# Patient Record
Sex: MALE | Race: BLACK OR AFRICAN AMERICAN | Employment: OTHER | URBAN - METROPOLITAN AREA
[De-identification: names, ages, dates, MRNs, and addresses within clinical notes are randomized per-mention and may not be internally consistent; named-entity substitution may affect disease eponyms.]

---

## 2017-01-31 ENCOUNTER — TELEPHONE (OUTPATIENT)
Dept: INTERNAL MEDICINE CLINIC | Age: 37
End: 2017-01-31

## 2017-03-31 ENCOUNTER — OFFICE VISIT (OUTPATIENT)
Dept: INTERNAL MEDICINE CLINIC | Age: 37
End: 2017-03-31

## 2017-03-31 VITALS
RESPIRATION RATE: 18 BRPM | WEIGHT: 234 LBS | HEART RATE: 52 BPM | DIASTOLIC BLOOD PRESSURE: 70 MMHG | HEIGHT: 72 IN | SYSTOLIC BLOOD PRESSURE: 100 MMHG | OXYGEN SATURATION: 98 % | BODY MASS INDEX: 31.69 KG/M2

## 2017-03-31 DIAGNOSIS — S33.5XXA LUMBAR SPRAIN, INITIAL ENCOUNTER: Primary | ICD-10-CM

## 2017-03-31 PROCEDURE — 99202 OFFICE O/P NEW SF 15 MIN: CPT | Performed by: FAMILY MEDICINE

## 2017-03-31 RX ORDER — CYCLOBENZAPRINE HCL 10 MG
10 TABLET ORAL 3 TIMES DAILY PRN
COMMUNITY
End: 2019-06-14

## 2017-03-31 RX ORDER — PREDNISONE 20 MG/1
20 TABLET ORAL DAILY
Qty: 10 TABLET | Refills: 0 | Status: SHIPPED | OUTPATIENT
Start: 2017-03-31 | End: 2017-04-10

## 2017-04-01 ENCOUNTER — HOSPITAL ENCOUNTER (OUTPATIENT)
Dept: OTHER | Age: 37
Discharge: HOME | End: 2017-04-01
Attending: FAMILY MEDICINE | Admitting: FAMILY MEDICINE

## 2017-04-04 ENCOUNTER — TELEPHONE (OUTPATIENT)
Dept: INTERNAL MEDICINE CLINIC | Age: 37
End: 2017-04-04

## 2017-04-04 DIAGNOSIS — S33.5XXA LUMBAR SPRAIN, INITIAL ENCOUNTER: Primary | ICD-10-CM

## 2017-04-10 ASSESSMENT — ENCOUNTER SYMPTOMS
CONSTIPATION: 0
DIARRHEA: 0
SHORTNESS OF BREATH: 0
ABDOMINAL PAIN: 0
TROUBLE SWALLOWING: 0
VOICE CHANGE: 0
BLOOD IN STOOL: 0

## 2019-06-14 ENCOUNTER — HOSPITAL ENCOUNTER (EMERGENCY)
Age: 39
Discharge: HOME | End: 2019-06-14
Attending: FAMILY MEDICINE
Payer: MEDICAID

## 2019-06-14 VITALS
HEART RATE: 71 BPM | TEMPERATURE: 98.2 F | OXYGEN SATURATION: 99 % | SYSTOLIC BLOOD PRESSURE: 127 MMHG | DIASTOLIC BLOOD PRESSURE: 78 MMHG | BODY MASS INDEX: 33.73 KG/M2 | RESPIRATION RATE: 15 BRPM | HEIGHT: 71 IN | WEIGHT: 240.96 LBS

## 2019-06-14 DIAGNOSIS — B34.9 VIRAL ILLNESS: Primary | ICD-10-CM

## 2019-06-14 DIAGNOSIS — M79.10 MYALGIA: ICD-10-CM

## 2019-06-14 LAB
RAPID INFLUENZA  B AGN: NEGATIVE
RAPID INFLUENZA A AGN: NEGATIVE

## 2019-06-14 PROCEDURE — 87804 INFLUENZA ASSAY W/OPTIC: CPT

## 2019-06-14 PROCEDURE — 99283 EMERGENCY DEPT VISIT LOW MDM: CPT

## 2019-06-14 PROCEDURE — 6370000000 HC RX 637 (ALT 250 FOR IP): Performed by: FAMILY MEDICINE

## 2019-06-14 RX ORDER — NAPROXEN 500 MG/1
500 TABLET ORAL 2 TIMES DAILY
Qty: 20 TABLET | Refills: 0 | Status: SHIPPED | OUTPATIENT
Start: 2019-06-14 | End: 2019-06-24

## 2019-06-14 RX ORDER — PROMETHAZINE HYDROCHLORIDE 25 MG/1
25 TABLET ORAL EVERY 6 HOURS PRN
Qty: 10 TABLET | Refills: 0 | Status: SHIPPED | OUTPATIENT
Start: 2019-06-14 | End: 2019-06-21

## 2019-06-14 RX ORDER — PROMETHAZINE HYDROCHLORIDE 25 MG/1
25 TABLET ORAL ONCE
Status: COMPLETED | OUTPATIENT
Start: 2019-06-14 | End: 2019-06-14

## 2019-06-14 RX ORDER — ACETAMINOPHEN 500 MG
1000 TABLET ORAL ONCE
Status: COMPLETED | OUTPATIENT
Start: 2019-06-14 | End: 2019-06-14

## 2019-06-14 RX ADMIN — ACETAMINOPHEN 1000 MG: 500 TABLET ORAL at 04:15

## 2019-06-14 RX ADMIN — PROMETHAZINE HYDROCHLORIDE 25 MG: 25 TABLET ORAL at 04:15

## 2019-06-14 ASSESSMENT — PAIN SCALES - GENERAL
PAINLEVEL_OUTOF10: 0
PAINLEVEL_OUTOF10: 9
PAINLEVEL_OUTOF10: 8

## 2019-06-14 ASSESSMENT — PAIN DESCRIPTION - DESCRIPTORS: DESCRIPTORS: ACHING

## 2019-06-14 ASSESSMENT — PAIN DESCRIPTION - FREQUENCY: FREQUENCY: CONTINUOUS

## 2019-06-14 ASSESSMENT — PAIN - FUNCTIONAL ASSESSMENT: PAIN_FUNCTIONAL_ASSESSMENT: PREVENTS OR INTERFERES SOME ACTIVE ACTIVITIES AND ADLS

## 2019-06-14 ASSESSMENT — PAIN DESCRIPTION - LOCATION: LOCATION: GENERALIZED

## 2019-06-14 ASSESSMENT — PAIN DESCRIPTION - ONSET: ONSET: ON-GOING

## 2019-06-14 ASSESSMENT — PAIN DESCRIPTION - PROGRESSION: CLINICAL_PROGRESSION: GRADUALLY WORSENING

## 2019-06-14 ASSESSMENT — PAIN DESCRIPTION - PAIN TYPE: TYPE: ACUTE PAIN

## 2019-06-20 NOTE — ED PROVIDER NOTES
Triage Chief Complaint:   Fever and Generalized Body Aches    EMEKA House  is a 41 y.o. male that presents with flulike illness symptoms to include muscle aches, subjective fever, runny nose. No cough or shortness of breath. No treatment tried at home. Patient is brought in by his wife who is pregnant requesting evaluation for flu to make sure that she does not need Tamiflu. Patient is tolerating p.o. No diarrhea constipation. No underlying immunocompromise condition. ROS:  General: Subjective fevers, no chills, no weakness  Eyes:  No recent vison changes, no discharge  ENT:  No sore throat, no nasal congestion, no hearing changes  Cardiovascular:  No chest pain, no palpitations  Respiratory:  No shortness of breath, no cough, no wheezing  Gastrointestinal: Generalized muscle pain, no nausea, no vomiting, no diarrhea  Musculoskeletal:  No muscle pain, no joint pain  Skin:  No rash, no pruritis, no easy bruising  Neurologic:  No speech problems, no headache, no extremity numbness, no extremity tingling, no extremity weakness  Psychiatric:  No anxiety, no hallucinations or delusions, no suicidal or homicidal ideation  Genitourinary:  No dysuria, no hematuria  Endocrine:  No unexpected weight gain, no unexpected weight loss  Extremities:  no edema, no pain    Past Medical History:   Diagnosis Date    Back pain     Bipolar disorder (HCC)     Chronic back pain     Depression     GERD (gastroesophageal reflux disease)      Past Surgical History:   Procedure Laterality Date    HERNIA REPAIR      INGUINAL HERNIA REPAIR      left side     History reviewed. No pertinent family history.   Social History     Socioeconomic History    Marital status: Single     Spouse name: Not on file    Number of children: Not on file    Years of education: Not on file    Highest education level: Not on file   Occupational History    Not on file   Social Needs    Financial resource strain: Not on file   Gareth-Kee insecurity:     Worry: Not on file     Inability: Not on file    Transportation needs:     Medical: Not on file     Non-medical: Not on file   Tobacco Use    Smoking status: Never Smoker    Smokeless tobacco: Never Used   Substance and Sexual Activity    Alcohol use: Yes     Alcohol/week: 2.4 oz     Types: 4 Shots of liquor per week     Comment: casual    Drug use: No    Sexual activity: Yes     Partners: Female   Lifestyle    Physical activity:     Days per week: Not on file     Minutes per session: Not on file    Stress: Not on file   Relationships    Social connections:     Talks on phone: Not on file     Gets together: Not on file     Attends [employer] service: Not on file     Active member of club or organization: Not on file     Attends meetings of clubs or organizations: Not on file     Relationship status: Not on file    Intimate partner violence:     Fear of current or ex partner: Not on file     Emotionally abused: Not on file     Physically abused: Not on file     Forced sexual activity: Not on file   Other Topics Concern    Not on file   Social History Narrative    ** Merged History Encounter **          No current facility-administered medications for this encounter. Current Outpatient Medications   Medication Sig Dispense Refill    naproxen (NAPROSYN) 500 MG tablet Take 1 tablet by mouth 2 times daily for 10 days 20 tablet 0    promethazine (PHENERGAN) 25 MG tablet Take 1 tablet by mouth every 6 hours as needed for Nausea 10 tablet 0     No Known Allergies    Nursing Notes Reviewed    Physical Exam:  ED Triage Vitals [06/14/19 0333]   Enc Vitals Group      /83      Pulse 79      Resp 14      Temp 98.9 °F (37.2 °C)      Temp Source Oral      SpO2 97 %      Weight 240 lb 15.4 oz (109.3 kg)      Height 5' 11\" (1.803 m)      Head Circumference       Peak Flow       Pain Score       Pain Loc       Pain Edu? Excl. in [address]?         My pulse ox interpretation is - normal    General appearance:  No acute distress. Skin:  Warm. Dry. No petechiae or purpura. Eye:  Extraocular movements intact. PERRLA  Ears, nose, mouth and throat:  Oral mucosa moist, no trismus. Tympanic membranes bilaterally normal.  Oropharynx with no exudate or erythema. Neck:  Trachea midline. Supple. No cervical lymphadenopathy  Extremity:  No swelling. Normal ROM. No calf pain or asymmetric swelling. No lower extremity edema  Heart:  Regular rate and rhythm, normal S1 & S2, no extra heart sounds. Perfusion:  Intact, capillary refill less than 2 seconds  Respiratory:  Lungs clear to auscultation bilaterally. Respirations nonlabored. Abdominal:  Normal bowel sounds. Soft. No peritoneal signs. No hepatosplenomegaly. Back:  No CVA tenderness to palpation. No bruising. No CTL tenderness to palpation or step-off  Neurological:  Alert and oriented times 3. No focal neuro deficits. Cranial nerves II through XII are grossly intact. Psychiatric:  Appropriate    I have reviewed and interpreted all of the currently available lab results from this visit (if applicable):  Results for orders placed or performed during the hospital encounter of 06/14/19   Rapid influenza A/B antigens   Result Value Ref Range    Rapid Influenza A Ag Negative Negative    Rapid Influenza B Ag Negative Negative      Radiographs (if obtained):  [] The following radiograph was interpreted by myself in the absence of a radiologist:   [] Radiologist's Report Reviewed:  No orders to display         EKG (if obtained): (All EKG's are interpreted by myself in the absence of a cardiologist)    Chart review shows recent radiographs:  No results found. MDM:  24-year-old nontoxic well-appearing male with normal vital signs. Symptoms treated with 1 g p.o. Tylenol as well as 25 mg p.o. Phenergan and 1 L of p.o. water which she tolerated without difficulty. Influenza a and B are negative.     I estimate there is LOW risk for

## 2021-08-22 PROCEDURE — 99283 EMERGENCY DEPT VISIT LOW MDM: CPT

## 2021-08-23 ENCOUNTER — HOSPITAL ENCOUNTER (EMERGENCY)
Age: 41
Discharge: HOME | End: 2021-08-23
Attending: STUDENT IN AN ORGANIZED HEALTH CARE EDUCATION/TRAINING PROGRAM
Payer: MEDICAID

## 2021-08-23 ENCOUNTER — APPOINTMENT (OUTPATIENT)
Dept: GENERAL RADIOLOGY | Age: 41
End: 2021-08-23
Payer: MEDICAID

## 2021-08-23 VITALS
DIASTOLIC BLOOD PRESSURE: 96 MMHG | WEIGHT: 240 LBS | OXYGEN SATURATION: 99 % | TEMPERATURE: 97 F | HEART RATE: 49 BPM | BODY MASS INDEX: 33.47 KG/M2 | RESPIRATION RATE: 16 BRPM | SYSTOLIC BLOOD PRESSURE: 153 MMHG

## 2021-08-23 DIAGNOSIS — S60.221A CONTUSION OF RIGHT HAND, INITIAL ENCOUNTER: Primary | ICD-10-CM

## 2021-08-23 PROCEDURE — 73130 X-RAY EXAM OF HAND: CPT

## 2021-08-23 RX ORDER — LIDOCAINE 50 MG/G
1 PATCH TOPICAL DAILY
Qty: 30 PATCH | Refills: 0 | Status: SHIPPED | OUTPATIENT
Start: 2021-08-23

## 2021-08-23 RX ORDER — MELOXICAM 7.5 MG/1
7.5 TABLET ORAL DAILY
Qty: 30 TABLET | Refills: 1 | Status: SHIPPED | OUTPATIENT
Start: 2021-08-23

## 2021-08-23 ASSESSMENT — ENCOUNTER SYMPTOMS: COLOR CHANGE: 0

## 2021-08-23 ASSESSMENT — PAIN SCALES - GENERAL: PAINLEVEL_OUTOF10: 3

## 2021-08-23 NOTE — LETTER
[facility] Emergency Department  [address] [facility], [address]             August 23, 2021    Patient: Shlomo Puente   YOB: 1980   Date of Visit: 8/22/2021       To Whom It May Concern:    Shlomo Puente was seen and treated in our emergency department on 8/22/2021. He can return to work on 8/24/2021.        Sincerely,         ***

## 2021-08-23 NOTE — ED PROVIDER NOTES
[facility]      Pt Name: Sindhu Hernandez  MRN: [id number]  [place] 1980  Date of evaluation: 8/22/2021  Provider: Brian Rivers MD    CHIEF COMPLAINT       Chief Complaint   Patient presents with    Hand Injury     pt states was playing punching game yesterday and injured his right hand. swelling to right hand. HISTORY OF PRESENT ILLNESS   (Location/Symptom, Timing/Onset, Context/Setting, Quality, Duration, Modifying Factors, Severity)  Note limiting factors. Sindhu Hernandez is a 46 y.o. male who presents to the emergency department with pain and swelling to the right third knuckle after he punched a ball during a game on Friday. He had immediate pain and swelling. Pain is achy, worse with palpation of the knuckle. He is able to move the digits of the hand otherwise. No wound. No numbness or color change to the fingertips. He has tried ibuprofen with some relief of the pain. This is his dominant hand. HPI    Nursing Notes were reviewed. REVIEW OF SYSTEMS    (2-9 systems for level 4, 10 or more for level 5)     Review of Systems   Musculoskeletal: Positive for arthralgias and joint swelling. Skin: Negative for color change and wound. Except as noted above the remainder of the review of systems was reviewed and negative. PAST MEDICAL HISTORY     Past Medical History:   Diagnosis Date    Back pain     Bipolar disorder ([address]     Chronic back pain     Depression     GERD (gastroesophageal reflux disease)          SURGICAL HISTORY       Past Surgical History:   Procedure Laterality Date    HERNIA REPAIR      INGUINAL HERNIA REPAIR      left side         CURRENT MEDICATIONS       Previous Medications    NAPROXEN (NAPROSYN) 500 MG TABLET    Take 1 tablet by mouth 2 times daily for 10 days       ALLERGIES     Patient has no known allergies. FAMILY HISTORY     History reviewed. No pertinent family history. Comments: 2+ radial and ulnar pulse on the right side  Musculoskeletal:      Comments: There is some tenderness and edema to the right third MCP. No wound. No scaphoid tenderness. No streaking up the digit. Able to range the MCP, DIP and PIP normally. Sensation and motor is intact in the distribution of the radial, medial and ulnar nerve. No scissoring of digits making a fist.  Interossei strength is intact. Neurological:      Mental Status: He is alert. Psychiatric:         Mood and Affect: Mood normal.         Behavior: Behavior normal.         DIAGNOSTIC RESULTS       RADIOLOGY:   Non-plain film images such as CT, Ultrasound and MRI are read by the radiologist. Plain radiographic images are visualized and preliminarily interpreted by the emergency physician with the below findings:    Interpretation per the Radiologist below, if available at the time of this note:    XR HAND RIGHT (MIN 3 VIEWS)   Final Result   Suspected dorsal hand soft tissue contusion. No evidence of underlying acute fracture. LABS:  Labs Reviewed - No data to display    All other labs were within normal range or not returned as of this dictation. EMERGENCY DEPARTMENT COURSE and DIFFERENTIAL DIAGNOSIS/MDM:   Vitals:    Vitals:    08/23/21 0017 08/23/21 0019   BP: (!) 153/96    Pulse: (!) 49    Resp: 16    Temp:  97 °F (36.1 °C)   TempSrc:  Oral   SpO2: 99%    Weight: 240 lb (108.9 kg)          Presentation is consistent with contusion of the right third MCP. The hand is otherwise neurovascularly intact. There is no open wound. There is no scaphoid tenderness to suggest occult fracture. Plain films not showing any acute fracture dislocation of the hand today. Ace wrap was applied for comfort and he was offered Toradol injection which he refused. We will treat pain at home from contusion with elevation, ice, heat and anti-inflammatories as below.   He can return to the ER for any new or worsening symptoms and is agreeable to plan. PROCEDURES:  Unless otherwise noted below, none     Procedures        FINAL IMPRESSION      1. Contusion of right hand, initial encounter          DISPOSITION/PLAN   DISPOSITION Discharge - Pending Orders Complete 08/23/2021 01:34:37 AM      PATIENT REFERRED TO:  your primary care physician    In 1 week        DISCHARGE MEDICATIONS:      New Prescriptions    LIDOCAINE (LIDODERM) 5 %    Place 1 patch onto the skin daily 12 hours on, 12 hours off. MELOXICAM (MOBIC) 7.5 MG TABLET    Take 1 tablet by mouth daily       Controlled Substances Monitoring:    If the patient was prescribed a controlled substance today, the PDMP was reviewed as documented below. No flowsheet data found.     (Please note that portions of this note were completed with a voice recognition program.  Efforts were made to edit the dictations but occasionally words are mis-transcribed.)    Matias Sheriff MD (electronically signed)  Attending Emergency Physician         Jorge Armendariz MD  08/23/21 [id number]

## 2022-10-28 ENCOUNTER — APPOINTMENT (OUTPATIENT)
Dept: CT IMAGING | Age: 42
End: 2022-10-28
Payer: MEDICAID

## 2022-10-28 ENCOUNTER — HOSPITAL ENCOUNTER (EMERGENCY)
Age: 42
Discharge: HOME | End: 2022-10-28
Payer: MEDICAID

## 2022-10-28 VITALS
SYSTOLIC BLOOD PRESSURE: 120 MMHG | DIASTOLIC BLOOD PRESSURE: 80 MMHG | TEMPERATURE: 98 F | RESPIRATION RATE: 18 BRPM | OXYGEN SATURATION: 99 % | HEART RATE: 70 BPM

## 2022-10-28 DIAGNOSIS — R10.12 ABDOMINAL PAIN, LEFT UPPER QUADRANT: Primary | ICD-10-CM

## 2022-10-28 DIAGNOSIS — R51.9 NONINTRACTABLE HEADACHE, UNSPECIFIED CHRONICITY PATTERN, UNSPECIFIED HEADACHE TYPE: ICD-10-CM

## 2022-10-28 LAB
A/G RATIO: 1.6 (ref 1.1–2.2)
ALBUMIN SERPL-MCNC: 4.6 G/DL (ref 3.4–5)
ALP BLD-CCNC: 48 U/L (ref 40–129)
ALT SERPL-CCNC: 12 U/L (ref 10–40)
ANION GAP SERPL CALCULATED.3IONS-SCNC: 7 MMOL/L (ref 3–16)
AST SERPL-CCNC: 16 U/L (ref 15–37)
BASOPHILS ABSOLUTE: 0.1 K/UL (ref 0–0.2)
BASOPHILS RELATIVE PERCENT: 1 %
BILIRUB SERPL-MCNC: <0.2 MG/DL (ref 0–1)
BUN BLDV-MCNC: 9 MG/DL (ref 7–20)
CALCIUM SERPL-MCNC: 9.1 MG/DL (ref 8.3–10.6)
CHLORIDE BLD-SCNC: 96 MMOL/L (ref 99–110)
CO2: 30 MMOL/L (ref 21–32)
CREAT SERPL-MCNC: 1.1 MG/DL (ref 0.9–1.3)
EOSINOPHILS ABSOLUTE: 0 K/UL (ref 0–0.6)
EOSINOPHILS RELATIVE PERCENT: 0 %
GFR SERPL CREATININE-BSD FRML MDRD: >60 ML/MIN/{1.73_M2}
GLUCOSE BLD-MCNC: 102 MG/DL (ref 70–99)
HCT VFR BLD CALC: 40.7 % (ref 40.5–52.5)
HEMOGLOBIN: 13.5 G/DL (ref 13.5–17.5)
LIPASE: 19 U/L (ref 13–60)
LYMPHOCYTES ABSOLUTE: 0.9 K/UL (ref 1–5.1)
LYMPHOCYTES RELATIVE PERCENT: 11 %
MCH RBC QN AUTO: 29.9 PG (ref 26–34)
MCHC RBC AUTO-ENTMCNC: 33.1 G/DL (ref 31–36)
MCV RBC AUTO: 90.1 FL (ref 80–100)
MONOCYTES ABSOLUTE: 1 K/UL (ref 0–1.3)
MONOCYTES RELATIVE PERCENT: 12 %
NEUTROPHILS ABSOLUTE: 6.1 K/UL (ref 1.7–7.7)
NEUTROPHILS RELATIVE PERCENT: 76 %
PDW BLD-RTO: 13.5 % (ref 12.4–15.4)
PLATELET # BLD: 235 K/UL (ref 135–450)
PMV BLD AUTO: 8.2 FL (ref 5–10.5)
POTASSIUM SERPL-SCNC: 4.7 MMOL/L (ref 3.5–5.1)
RBC # BLD: 4.52 M/UL (ref 4.2–5.9)
SODIUM BLD-SCNC: 133 MMOL/L (ref 136–145)
TOTAL PROTEIN: 7.5 G/DL (ref 6.4–8.2)
WBC # BLD: 8 K/UL (ref 4–11)

## 2022-10-28 PROCEDURE — 74177 CT ABD & PELVIS W/CONTRAST: CPT

## 2022-10-28 PROCEDURE — 36415 COLL VENOUS BLD VENIPUNCTURE: CPT

## 2022-10-28 PROCEDURE — 6360000004 HC RX CONTRAST MEDICATION: Performed by: PHYSICIAN ASSISTANT

## 2022-10-28 PROCEDURE — 80053 COMPREHEN METABOLIC PANEL: CPT

## 2022-10-28 PROCEDURE — 6370000000 HC RX 637 (ALT 250 FOR IP): Performed by: PHYSICIAN ASSISTANT

## 2022-10-28 PROCEDURE — 85025 COMPLETE CBC W/AUTO DIFF WBC: CPT

## 2022-10-28 PROCEDURE — 83690 ASSAY OF LIPASE: CPT

## 2022-10-28 PROCEDURE — 99285 EMERGENCY DEPT VISIT HI MDM: CPT

## 2022-10-28 RX ORDER — OMEPRAZOLE 40 MG/1
40 CAPSULE, DELAYED RELEASE ORAL
Qty: 30 CAPSULE | Refills: 0 | Status: SHIPPED | OUTPATIENT
Start: 2022-10-28

## 2022-10-28 RX ORDER — ACETAMINOPHEN 500 MG
TABLET ORAL
Qty: 60 TABLET | Refills: 0 | Status: SHIPPED | OUTPATIENT
Start: 2022-10-28

## 2022-10-28 RX ORDER — ACETAMINOPHEN 500 MG
1000 TABLET ORAL ONCE
Status: COMPLETED | OUTPATIENT
Start: 2022-10-28 | End: 2022-10-28

## 2022-10-28 RX ADMIN — ALUMINUM HYDROXIDE, MAGNESIUM HYDROXIDE, AND SIMETHICONE: 200; 200; 20 SUSPENSION ORAL at 16:48

## 2022-10-28 RX ADMIN — IOPAMIDOL 75 ML: 755 INJECTION, SOLUTION INTRAVENOUS at 17:45

## 2022-10-28 RX ADMIN — ACETAMINOPHEN 1000 MG: 500 TABLET ORAL at 16:31

## 2022-10-28 ASSESSMENT — PAIN SCALES - GENERAL: PAINLEVEL_OUTOF10: 10

## 2022-10-28 ASSESSMENT — ENCOUNTER SYMPTOMS
COUGH: 0
NAUSEA: 0
SHORTNESS OF BREATH: 0
VOMITING: 0
BACK PAIN: 0
ABDOMINAL PAIN: 1
SORE THROAT: 0
EYE PAIN: 0

## 2022-10-28 NOTE — ED PROVIDER NOTES
**ADVANCED PRACTICE PROVIDER, I HAVE EVALUATED THIS PATIENT**        [address]      Pt Name: Vanessa Gonsalves  PQE:[id number]  [place] 1980  Date of evaluation: 10/28/2022  Provider: Jermain Beauchamp PA-C      Chief Complaint:    Chief Complaint   Patient presents with    Abdominal Pain     Pt states that he has been having abd pain since yesterday along with emesis no diarrhea  pt states he has been around someone with the flu    Headache     Pt states that he is also having a headache since yesterday          Nursing Notes, Past Medical Hx, Past Surgical Hx, Social Hx, Allergies, and Family Hx were all reviewed and agreed with or any disagreements were addressed in the HPI.    HPI: (Location, Duration, Timing, Severity, Quality, Assoc Sx, Context, Modifying factors)    Chief Complaint of left upper quadrant abdominal pain. And complaint of headache. Both started yesterday. No history of headache. Had a small local frontal.  Denies any visual changes. No lightheaded or dizziness. Denies neck pain or neck stiffness, no fevers. He states he took [place] for the abdominal pain. He has not taken anything for the headache. No nausea vomiting. Denies passing blood in the stool. No black tarry stools. No loss of bowel or urinary control. No weakness. No other complaints. This is a  38 y.o. male who presents to the emergency room with the above complaint. PastMedical/Surgical History:      Diagnosis Date    Back pain     Bipolar disorder (HCC)     Chronic back pain     Depression     GERD (gastroesophageal reflux disease)          Procedure Laterality Date    HERNIA REPAIR      INGUINAL HERNIA REPAIR      left side       Medications:  Previous Medications    LIDOCAINE (LIDODERM) 5 %    Place 1 patch onto the skin daily 12 hours on, 12 hours off.     MELOXICAM (MOBIC) 7.5 MG TABLET    Take 1 tablet by mouth daily    NAPROXEN (NAPROSYN) 500 MG TABLET    Take 1 tablet by mouth 2 times daily for 10 days         Review of Systems:  (2-9 systems needed)  Review of Systems   Constitutional:  Negative for chills and fever. HENT:  Negative for congestion and sore throat. Eyes:  Negative for pain and visual disturbance. Respiratory:  Negative for cough and shortness of breath. Cardiovascular:  Negative for chest pain and leg swelling. Gastrointestinal:  Positive for abdominal pain. Negative for nausea and vomiting. Genitourinary:  Negative for dysuria and frequency. Musculoskeletal:  Negative for back pain and neck pain. Skin:  Negative for rash and wound. Neurological:  Positive for headaches. Negative for dizziness and light-headedness. \"Positives and Pertinent negatives as per HPI\"    Physical Exam:  Physical Exam  Vitals and nursing note reviewed. Constitutional:       Appearance: He is well-developed. He is not diaphoretic. HENT:      Head: Normocephalic and atraumatic. Nose: Nose normal.   Eyes:      General: No scleral icterus. Right eye: No discharge. Left eye: No discharge. Conjunctiva/sclera: Conjunctivae normal.      Pupils: Pupils are equal, round, and reactive to light. Neck:      Comments: No nuchal rigidity  Cardiovascular:      Rate and Rhythm: Normal rate and regular rhythm. Heart sounds: Normal heart sounds. No murmur heard. No friction rub. No gallop. Pulmonary:      Effort: Pulmonary effort is normal. No respiratory distress. Breath sounds: Normal breath sounds. No wheezing or rales. Chest:      Chest wall: No tenderness. Abdominal:      General: Abdomen is flat. Bowel sounds are normal. There is no distension. Palpations: Abdomen is soft. There is no mass. Tenderness: There is abdominal tenderness. There is no guarding or rebound. Musculoskeletal:         General: Normal range of motion.       Cervical back: Normal, full passive range of motion without pain, normal range of motion and neck supple. No pain with movement, spinous process tenderness or muscular tenderness. Normal range of motion. Thoracic back: Normal.      Lumbar back: Normal.   Skin:     General: Skin is warm and dry. Neurological:      General: No focal deficit present. Mental Status: He is alert and oriented to person, place, and time. He is not disoriented. GCS: GCS eye subscore is 4. GCS verbal subscore is 5. GCS motor subscore is 6. Cranial Nerves: No cranial nerve deficit. Sensory: Sensation is intact. No sensory deficit. Motor: Motor function is intact. No tremor, abnormal muscle tone or seizure activity. Coordination: Coordination is intact. Coordination normal.      Gait: Gait is intact. Comments: Finger to nose normal   Psychiatric:         Behavior: Behavior normal.       MEDICAL DECISION MAKING    Vitals:    Vitals:    10/28/22 1537   BP: 120/83   Pulse: 72   Resp: 18   Temp: 99.9 °F (37.7 °C)   TempSrc: Oral   SpO2: 99%       LABS:  Labs Reviewed   CBC WITH AUTO DIFFERENTIAL - Abnormal; Notable for the following components:       Result Value    Lymphocytes Absolute 0.9 (*)     All other components within normal limits   COMPREHENSIVE METABOLIC PANEL - Abnormal; Notable for the following components:    Sodium 133 (*)     Chloride 96 (*)     Glucose 102 (*)     All other components within normal limits   LIPASE   URINALYSIS WITH REFLEX TO CULTURE        Remainder of labs reviewed and were negative at this time or not returned at the time of this note. RADIOLOGY:   Non-plain film images such as CT, Ultrasound and MRI are read by the radiologist. Jayjay Singleton PA-C have directly visualized the radiologic plain film image(s) with the below findings:      Interpretation per the Radiologist below, if available at the time of this note:    CT ABDOMEN PELVIS W IV CONTRAST Additional Contrast? None   Preliminary Result   1.  Normal appendix. Mild diverticulosis but no acute diverticulitis. 2. No evidence of gallbladder or biliary disease. No evidence of acute   pancreatitis. No results found. MEDICAL DECISION MAKING / ED COURSE:      PROCEDURES:   Procedures    None    Patient was given:  Medications   aluminum & magnesium hydroxide-simethicone (MAALOX) 30 mL, lidocaine viscous hcl (XYLOCAINE) 5 mL (GI COCKTAIL) ( Oral Given 10/28/22 1648)   acetaminophen (TYLENOL) tablet 1,000 mg (1,000 mg Oral Given 10/28/22 1631)   iopamidol (ISOVUE-370) 76 % injection 75 mL (75 mLs IntraVENous Given 10/28/22 1745)     Emergency room course: Patient on exam pupils equal round and reactive to light extraocular movement is intact. Throat is clear. Neck is supple full range of motion no nuchal rigidity. Patient has no midline tender cervical, thoracic or lumbar spine. Cardiovascular regular rate rhythm, lungs are clear. No wheeze, rales or rhonchi noted. No chest wall tenderness. Abdomen is soft mild left upper quadrant tenderness with palpation. No rebound or guarding noted. No CVA or flank tenderness. Full range of motion all extremity. Good strength against resisted plantar and dorsiflexion. Negative straight leg raise. He has no facial drooping no slurred speech noted. Alert oriented x4. Does not appear to be in acute distress.  strength 5+ equal bilaterally. Lab from today showed:  CBC within normal limits her white count 8.0. CMP unremarkable. Lipase 19.0. CT scan shows unremarkable. Discussed CT scan report with patient. Discussed discharge plan. I will put him on Prilosec for the left upper quadrant hanny pain I do believe may be dyspepsia or GERD. I will put him on Tylenol for his headache. Referred to GI if abdominal pain worsens or no improvement. Patient was okay with this plan he will be discharged stable condition. The patient tolerated their visit well.   I evaluated the patient. The physician was available for consultation as needed. The patient and / or the family were informed of the results of any tests, a time was given to answer questions, a plan was proposed and they agreed with plan. I am the Primary Clinician of Record. CLINICAL IMPRESSION:  1. Abdominal pain, left upper quadrant    2.  Nonintractable headache, unspecified chronicity pattern, unspecified headache type        DISPOSITION Decision To Discharge 10/28/2022 07:02:01 PM      PATIENT REFERRED TO:  [place] [address]  [phone]    Call   As needed, If symptoms worsen    DISCHARGE MEDICATIONS:  New Prescriptions    ACETAMINOPHEN (TYLENOL) 500 MG TABLET    Take 1 or 2 tablets by mouth every 8 hours as needed for pain and or fever    OMEPRAZOLE (PRILOSEC) 40 MG DELAYED RELEASE CAPSULE    Take 1 capsule by mouth every morning (before breakfast)       DISCONTINUED MEDICATIONS:  Discontinued Medications    No medications on file              (Please note the MDM and HPI sections of this note were completed with a voice recognition program.  Efforts were made to edit the dictations but occasionally words are mis-transcribed.)    Electronically signed, Jeff Lazaro PA-C,          Jeff Lazaro PA-C  10/28/22 1965

## 2022-10-28 NOTE — DISCHARGE INSTRUCTIONS
Take prescribed medication as prescribed only  Follow-up with gastroenterologist if worsens or no improvement  Get established with a primary care physician    [facility] ([facility]) Referral number [phone] for 7[facility]  [address] [facility] [address]. 403 [facility]  Fax [phone]  Medical, OB/Gyn, Pediatrics, [facility]  Serves all of [facility] Healthy Beginnings (Formerly [address])  [address] [place] 99[facility], 20201 [facility]  [address]  [address]. (Administrative offices)  [phone]  Homeless only [facility])  100 [facility] [place],  [address]  [phone] or [phone], 2097 [facility] [place],   Dental Appointments [phone] or [phone]  Pediatric, Family Practice, X-Ray  Serves all of [facility] ([facility])  [facility] Eh [address]. [address]. [address]  [phone]   [facility] ([place].  Healthy)   [address] [facility] [address]    (Located in [id number] or [phone], 2097 [facility] [place],   Dental Appointments [phone] or [phone]     [facility]  [place] [address], [address]  [facility] 90  [address].  [facility] Fax 267 [facility] and Surrounding areas [facility]  1000 [facility]. [address] [place] [address] Fax [phone]  Medical, OB/Gyn, Pediatrics  Dental Clinic, [facility] limits [facility]  1001 [facility]  [phone] Fax [phone]  [facility], Pediatrics, ADRI[facility], [address] [place] [address] SOUTH  [facility] [address]    [phone]  Urgent Care, Open 24 hrs, Urgent care, Gyn, Prenatal, Dental Mental, [address] [facility] [address]   5904 [facility], [address] [phone]  (Located: [address])  Pediatrics [phone], 2317 [facility],   Dental Appointments [phone] or [phone] 2500 [facility] 167. [address], TANVI, Pediatrics, 4100 [facility] 1501 [facility]  [place] [address], [place], [address]  [phone] Fax [phone]  Pediatrics, [address]   [facility]  175 [facility]. [address] [address] [phone]   Fax [phone]  Pediatrics, [address]  [address]. [address]  [address] [facility] [address]. [address] [phone]  Pediatrics, Internal Med, TANVI, [address], Dental Clinic [facility] 99  [address] [place] [address]   [phone] [facility]  800 [facility]  [phone] Fax [phone]  General Medical Clinic  Sliding scale fee  All [place] area     777 [facility]  5730 [facility]. [place], [place]  [place] 63  [address] DuizendSouthwell Medical Centertraat [address], [address]  5656 [facility]302   8200 [facility], 22338 S [facility]  4418 [facility]  [address] [place] [address].   [place], [address]  [facility]  [phone]  Fax:  [phone]  [facility] Internal Medicine and Pediatrics  [phone]  Fax:  [phone]  Desiree Javier Woo  Resident Practice  [phone]  Fax:  5503 [facility]  [phone]  Fax:  [phone]     400 [facility] ([address] of [address])  0560 [facility] Resources, 502 W [facility]  [phone]    Evelyn Maria (Continued)    [facility] Source of [facility])  [address]  NAVA DavisAmena Walker 88  535 [facility]   (formerly [facility])   2900 [facility] route Sarai De La Torre 13, [address] [place] [phone] [address] Floor Family Practice   [facility], VICKY Source of [facility])  67 [facility] 3950 [facility], [facility]  [phone]       [place]. [facility]  ([address])  [address] 901 [facility], [address]  [address]902 [facility]  ([facility] [place])  800 [facility]. Armin [address] S, [facility], [address]  501 [facility]  ([address] of [facility])  [facility] 6, [address]  [phone]   [address] ([address] of [facility])  [place] [address] [facility]  [phone]    [address]  [phone]  General Family Practice, Pediatrics  Services all areas [facility] 178, [address] [facility] [address]  [address], Pediatrics, [address]   (formerly [facility])  [address] [facility] [address], [address]  [address] Susie Bri[address] (formerly [address])  [address] [place], [address]  [address]  [facility]P [place] ([address] of [facility])  René  96..    Dotty Patricia  9911 0053, Prenatal, Dental, Mental, [address] [facility] [address]   [phone]

## 2023-10-29 ENCOUNTER — HOSPITAL ENCOUNTER (EMERGENCY)
Age: 43
Discharge: HOME | End: 2023-10-29
Attending: EMERGENCY MEDICINE
Payer: MEDICAID

## 2023-10-29 ENCOUNTER — APPOINTMENT (OUTPATIENT)
Dept: GENERAL RADIOLOGY | Age: 43
End: 2023-10-29
Payer: MEDICAID

## 2023-10-29 VITALS
BODY MASS INDEX: 28.64 KG/M2 | OXYGEN SATURATION: 96 % | WEIGHT: 211.42 LBS | TEMPERATURE: 97.8 F | HEIGHT: 72 IN | RESPIRATION RATE: 16 BRPM | DIASTOLIC BLOOD PRESSURE: 85 MMHG | SYSTOLIC BLOOD PRESSURE: 141 MMHG | HEART RATE: 69 BPM

## 2023-10-29 DIAGNOSIS — M89.8X1 PAIN OF LEFT SCAPULA: Primary | ICD-10-CM

## 2023-10-29 PROCEDURE — 71046 X-RAY EXAM CHEST 2 VIEWS: CPT

## 2023-10-29 PROCEDURE — 6370000000 HC RX 637 (ALT 250 FOR IP): Performed by: EMERGENCY MEDICINE

## 2023-10-29 PROCEDURE — 99283 EMERGENCY DEPT VISIT LOW MDM: CPT

## 2023-10-29 RX ORDER — CYCLOBENZAPRINE HCL 10 MG
10 TABLET ORAL ONCE
Status: COMPLETED | OUTPATIENT
Start: 2023-10-29 | End: 2023-10-29

## 2023-10-29 RX ORDER — CYCLOBENZAPRINE HCL 10 MG
10 TABLET ORAL 3 TIMES DAILY PRN
Qty: 21 TABLET | Refills: 0 | Status: SHIPPED | OUTPATIENT
Start: 2023-10-29 | End: 2023-11-08

## 2023-10-29 RX ADMIN — CYCLOBENZAPRINE 10 MG: 10 TABLET, FILM COATED ORAL at 02:37

## 2023-10-29 ASSESSMENT — PAIN DESCRIPTION - ORIENTATION
ORIENTATION: LEFT
ORIENTATION: LEFT

## 2023-10-29 ASSESSMENT — PATIENT HEALTH QUESTIONNAIRE - PHQ9
1. LITTLE INTEREST OR PLEASURE IN DOING THINGS: 0
2. FEELING DOWN, DEPRESSED OR HOPELESS: 0
SUM OF ALL RESPONSES TO PHQ QUESTIONS 1-9: 0
SUM OF ALL RESPONSES TO PHQ QUESTIONS 1-9: 0
SUM OF ALL RESPONSES TO PHQ9 QUESTIONS 1 & 2: 0
SUM OF ALL RESPONSES TO PHQ QUESTIONS 1-9: 0
SUM OF ALL RESPONSES TO PHQ QUESTIONS 1-9: 0

## 2023-10-29 ASSESSMENT — PAIN DESCRIPTION - LOCATION
LOCATION: RIB CAGE
LOCATION: RIB CAGE

## 2023-10-29 ASSESSMENT — LIFESTYLE VARIABLES
HOW MANY STANDARD DRINKS CONTAINING ALCOHOL DO YOU HAVE ON A TYPICAL DAY: PATIENT DOES NOT DRINK
HOW OFTEN DO YOU HAVE A DRINK CONTAINING ALCOHOL: NEVER

## 2023-10-29 ASSESSMENT — PAIN - FUNCTIONAL ASSESSMENT
PAIN_FUNCTIONAL_ASSESSMENT: 0-10
PAIN_FUNCTIONAL_ASSESSMENT: ACTIVITIES ARE NOT PREVENTED

## 2023-10-29 ASSESSMENT — PAIN SCALES - GENERAL
PAINLEVEL_OUTOF10: 10
PAINLEVEL_OUTOF10: 8
PAINLEVEL_OUTOF10: 10

## 2023-10-29 ASSESSMENT — PAIN DESCRIPTION - DESCRIPTORS: DESCRIPTORS: ACHING

## 2023-10-29 NOTE — ED TRIAGE NOTES
Pt into ED with c/o 10/10 L rib pain x2 days ago. Pain increases with inhalation. Pt denies injury, or new activities. Pt has not taken medication before arrival to ED.      Pt hypertensive in triage, denies HTN hx.

## 2023-10-29 NOTE — ED PROVIDER NOTES
Tenderness palpation in rhomboid region just medial to the scapula on the left  Chest:      Chest wall: No tenderness. Abdominal:      General: There is no distension. Palpations: Abdomen is soft. There is no mass. Tenderness: There is no abdominal tenderness. There is no guarding or rebound. Musculoskeletal:         General: No tenderness or deformity. Normal range of motion. Cervical back: Normal range of motion. Skin:     General: Skin is warm. Coloration: Skin is not pale. Findings: No erythema or rash. Neurological:      Mental Status: He is alert. Cranial Nerves: No cranial nerve deficit. Motor: No abnormal muscle tone. Coordination: Coordination normal.         DIAGNOSTIC RESULTS     RADIOLOGY:   Non-plain film images such as CT, Ultrasoundand MRI are read by the radiologist. Plain radiographic images are visualized and preliminarily interpreted by the emergency physician with the below findings:    X-ray reassuring    ED BEDSIDE ULTRASOUND:   Performed by ED Physician - none    LABS:  Labs Reviewed - No data to display    All other labs were withinnormal range or not returned as of this dictation. EMERGENCY DEPARTMENT COURSE and DIFFERENTIAL DIAGNOSIS/MDM:     PMH, Surgical Hx, FH, Social Hx reviewed by myself (ETOH usage, Tobacco usage, Drug usage reviewed by myself, no pertinent Hx)- No Pertinent Hx     Old records were reviewed by me     MDM 82-year-old with left scapular pain. Tender to palpation. Worse with movement. X-ray reassuring. Musculoskeletal nature. Outpatient follow-up.     DDX-   Social Determinants (Poverty, Education, uninsured) -   Prior notes-   Name and route all medications-   Charting interpretations all by myself-   Diagnosis descriptions-   Consults-   Disposition- Considered -   I estimate there is LOW risk for Sepsis, MI, Stroke, Tamponade, PTX, Toxicity or other life threatening etiology thus I consider the discharge

## 2024-08-28 ENCOUNTER — HOSPITAL ENCOUNTER (EMERGENCY)
Age: 44
Discharge: HOME | End: 2024-08-28
Payer: OTHER MISCELLANEOUS

## 2024-08-28 VITALS
SYSTOLIC BLOOD PRESSURE: 125 MMHG | OXYGEN SATURATION: 98 % | HEART RATE: 49 BPM | DIASTOLIC BLOOD PRESSURE: 61 MMHG | RESPIRATION RATE: 17 BRPM | TEMPERATURE: 98.7 F
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
## 2024-08-28 DIAGNOSIS — S46.812A TRAPEZIUS STRAIN, LEFT, INITIAL ENCOUNTER: ICD-10-CM

## 2024-08-28 PROCEDURE — 99283 EMERGENCY DEPT VISIT LOW MDM: CPT

## 2024-08-28 RX ORDER — IBUPROFEN 800 MG/1
800 TABLET, FILM COATED ORAL EVERY 8 HOURS PRN
Qty: 30 TABLET | Refills: 0 | Status: SHIPPED | OUTPATIENT
Start: 2024-08-28

## 2024-08-28 RX ORDER — LIDOCAINE 50 MG/G
1 PATCH TOPICAL DAILY
Qty: 20 PATCH | Refills: 0 | Status: SHIPPED | OUTPATIENT
Start: 2024-08-28 | End: 2024-09-17

## 2024-08-28 RX ORDER — METHOCARBAMOL 750 MG/1
750 TABLET, FILM COATED ORAL 2 TIMES DAILY
Qty: 20 TABLET | Refills: 0 | Status: SHIPPED | OUTPATIENT
Start: 2024-08-28

## 2024-08-28 ASSESSMENT — ENCOUNTER SYMPTOMS
NAUSEA: 0
SORE THROAT: 0
BACK PAIN: 1
EYE PAIN: 0
ABDOMINAL PAIN: 0
COUGH: 0
VOMITING: 0
SHORTNESS OF BREATH: 0

## 2024-08-28 ASSESSMENT — PAIN DESCRIPTION - DESCRIPTORS: DESCRIPTORS: SORE

## 2024-08-28 ASSESSMENT — PAIN - FUNCTIONAL ASSESSMENT: PAIN_FUNCTIONAL_ASSESSMENT: 0-10

## 2024-08-28 ASSESSMENT — PAIN DESCRIPTION - LOCATION: LOCATION: NECK;SHOULDER

## 2024-08-28 ASSESSMENT — PAIN SCALES - GENERAL: PAINLEVEL_OUTOF10: 5

## 2024-08-28 NOTE — DISCHARGE INSTRUCTIONS
Take prescribed medication as prescribed only   [facility] [address]  Lidocaine patches only to be worn for 12 hours.  12 hours on and 12 hours off before replacing with another patch as needed
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
## 2024-08-29 ENCOUNTER — TELEPHONE (OUTPATIENT)
Dept: ORTHOPEDIC SURGERY | Age: 44
End: 2024-08-29

## 2024-08-29 NOTE — TELEPHONE ENCOUNTER
Unable to leave message regarding ED referral for an appointment. Upon return call please schedule with Dr. Jones